# Patient Record
Sex: MALE | Race: BLACK OR AFRICAN AMERICAN | NOT HISPANIC OR LATINO | URBAN - METROPOLITAN AREA
[De-identification: names, ages, dates, MRNs, and addresses within clinical notes are randomized per-mention and may not be internally consistent; named-entity substitution may affect disease eponyms.]

---

## 2023-06-16 ENCOUNTER — EMERGENCY (EMERGENCY)
Facility: HOSPITAL | Age: 23
LOS: 1 days | Discharge: HOME | End: 2023-06-16
Admitting: EMERGENCY MEDICINE
Payer: MEDICAID

## 2023-06-16 VITALS
RESPIRATION RATE: 16 BRPM | OXYGEN SATURATION: 99 % | TEMPERATURE: 98 F | DIASTOLIC BLOOD PRESSURE: 90 MMHG | SYSTOLIC BLOOD PRESSURE: 133 MMHG | HEART RATE: 83 BPM

## 2023-06-16 PROCEDURE — 99284 EMERGENCY DEPT VISIT MOD MDM: CPT

## 2023-06-16 PROCEDURE — 93010 ELECTROCARDIOGRAM REPORT: CPT
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
## 2023-06-17 PROCEDURE — 71046 X-RAY EXAM CHEST 2 VIEWS: CPT | Mod: 26

## 2023-06-17 PROCEDURE — 70360 X-RAY EXAM OF NECK: CPT | Mod: 26

## 2023-06-17 NOTE — ED PROVIDER NOTE - OBJECTIVE STATEMENT
Patient is a 22-year-old male with no significant past medical history presenting with a complaint of intermittent chest/throat discomfort x1 year that has increased in intensity x tonight. Reports a sensation of fullness in epigastrium that is alleviated by belching or passing flatus.  He also reports having a foriegn body sensation with dryness throat.  He states symptoms started after swallowing the end of a marijuana joint.  Cord
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
## 2023-06-17 NOTE — ED ADULT NURSE NOTE - NSFALLUNIVINTERV_ED_ALL_ED
Bed/Stretcher in lowest position, wheels locked, appropriate side rails in place/Call bell, personal items and telephone in reach/Instruct patient to call for assistance before getting out of bed/chair/stretcher/Non-slip footwear applied when patient is off stretcher/[place] to call system/Physically safe environment - no spills, clutter or unnecessary equipment/Purposeful proactive rounding/Room/bathroom lighting operational, light cord in reach

## 2023-06-17 NOTE — ED PROVIDER NOTE - PATIENT PORTAL LINK FT
You can access the FollowMyHealth Patient Portal offered by [facility] by registering at the following website: [email or web address]. By joining [facility]’s FollowMyHealth portal, you will also be able to view your health information using other applications (apps) compatible with our system.

## 2023-06-17 NOTE — ED PROVIDER NOTE - CLINICAL SUMMARY MEDICAL DECISION MAKING FREE TEXT BOX
22-year-old male with no significant past medical history presenting with a complaint of intermittent chest pain and foreign body sensation to throat x1 year, symptoms incited after swallowing a piece of marijuana joint.  On presentation patient is well-appearing, vital stable, EKG without any ischemic changes.  Symptoms likely due to indigestion  versus gastritis as patient states symptoms are alleviated by belching/passing flatus.  Low clinical concern for acute ACS event as patient has no cardiac risk factors.  Will obtain chest x-ray and soft tissue neck x-ray to rule out possible pneumothorax or throat foreign body.  Imaging studies are negative patient will be advised to follow-up with PCP

## 2023-06-17 NOTE — ED PROVIDER NOTE - CPE EDP CARDIAC NORM
normal... no abdominal distension/no blood in stool/no burning urination/no chills/no dysuria/no fever/no hematuria/no nausea/no vomiting